# Patient Record
Sex: MALE | Race: WHITE | NOT HISPANIC OR LATINO | Employment: FULL TIME | URBAN - METROPOLITAN AREA
[De-identification: names, ages, dates, MRNs, and addresses within clinical notes are randomized per-mention and may not be internally consistent; named-entity substitution may affect disease eponyms.]

---

## 2024-03-18 ASSESSMENT — PROMIS GLOBAL HEALTH SCALE
EMOTIONAL_PROBLEMS: NEVER
RATE_QUALITY_OF_LIFE: VERY GOOD
RATE_PHYSICAL_HEALTH: GOOD
RATE_AVERAGE_PAIN: 1
CARRYOUT_SOCIAL_ACTIVITIES: VERY GOOD
RATE_MENTAL_HEALTH: VERY GOOD
RATE_GENERAL_HEALTH: GOOD
CARRYOUT_PHYSICAL_ACTIVITIES: COMPLETELY
RATE_SOCIAL_SATISFACTION: VERY GOOD

## 2024-03-19 ENCOUNTER — OFFICE VISIT (OUTPATIENT)
Dept: PRIMARY CARE | Facility: CLINIC | Age: 44
End: 2024-03-19
Payer: COMMERCIAL

## 2024-03-19 VITALS
BODY MASS INDEX: 33.03 KG/M2 | SYSTOLIC BLOOD PRESSURE: 152 MMHG | HEIGHT: 69 IN | WEIGHT: 223 LBS | DIASTOLIC BLOOD PRESSURE: 86 MMHG

## 2024-03-19 DIAGNOSIS — E11.9 DIABETES MELLITUS TYPE 2 IN NONOBESE (MULTI): ICD-10-CM

## 2024-03-19 DIAGNOSIS — E78.2 MIXED HYPERLIPIDEMIA: ICD-10-CM

## 2024-03-19 DIAGNOSIS — Z00.00 HEALTHCARE MAINTENANCE: ICD-10-CM

## 2024-03-19 PROBLEM — R06.83 SNORING: Status: ACTIVE | Noted: 2024-03-19

## 2024-03-19 PROBLEM — A60.00 GENITAL HERPES: Status: ACTIVE | Noted: 2024-03-19

## 2024-03-19 PROBLEM — R21 RASH: Status: ACTIVE | Noted: 2024-03-19

## 2024-03-19 PROBLEM — K21.9 ESOPHAGEAL REFLUX: Status: ACTIVE | Noted: 2024-03-19

## 2024-03-19 PROBLEM — G47.33 OSA ON CPAP: Status: ACTIVE | Noted: 2024-03-19

## 2024-03-19 PROBLEM — S69.90XA FINGER INJURY: Status: ACTIVE | Noted: 2024-03-19

## 2024-03-19 PROBLEM — E78.5 HYPERLIPIDEMIA: Status: ACTIVE | Noted: 2024-03-19

## 2024-03-19 PROBLEM — E55.9 VITAMIN D DEFICIENCY: Status: ACTIVE | Noted: 2024-03-19

## 2024-03-19 PROBLEM — R11.2 NAUSEA & VOMITING: Status: ACTIVE | Noted: 2024-03-19

## 2024-03-19 PROBLEM — J30.1 ALLERGIC RHINITIS DUE TO POLLEN: Status: ACTIVE | Noted: 2024-03-19

## 2024-03-19 PROBLEM — R74.8 ELEVATED LIVER ENZYMES: Status: ACTIVE | Noted: 2024-03-19

## 2024-03-19 PROBLEM — K20.0 EOSINOPHILIC ESOPHAGITIS: Status: ACTIVE | Noted: 2024-03-19

## 2024-03-19 PROBLEM — H10.45 CHRONIC ALLERGIC CONJUNCTIVITIS: Status: ACTIVE | Noted: 2024-03-19

## 2024-03-19 PROBLEM — J01.90 ACUTE SINUSITIS: Status: ACTIVE | Noted: 2024-03-19

## 2024-03-19 PROBLEM — S62.637A CLOSED DISPLACED FRACTURE OF DISTAL PHALANX OF LEFT LITTLE FINGER: Status: ACTIVE | Noted: 2024-03-19

## 2024-03-19 PROBLEM — R13.10 DYSPHAGIA: Status: ACTIVE | Noted: 2024-03-19

## 2024-03-19 PROBLEM — E87.1 HYPONATREMIA: Status: ACTIVE | Noted: 2024-03-19

## 2024-03-19 PROBLEM — R73.01 IMPAIRED FASTING GLUCOSE: Status: ACTIVE | Noted: 2024-03-19

## 2024-03-19 PROBLEM — K21.00 REFLUX ESOPHAGITIS: Status: ACTIVE | Noted: 2024-03-19

## 2024-03-19 PROCEDURE — 3077F SYST BP >= 140 MM HG: CPT | Performed by: INTERNAL MEDICINE

## 2024-03-19 PROCEDURE — 99396 PREV VISIT EST AGE 40-64: CPT | Performed by: INTERNAL MEDICINE

## 2024-03-19 PROCEDURE — 1036F TOBACCO NON-USER: CPT | Performed by: INTERNAL MEDICINE

## 2024-03-19 PROCEDURE — 3079F DIAST BP 80-89 MM HG: CPT | Performed by: INTERNAL MEDICINE

## 2024-03-19 RX ORDER — METFORMIN HYDROCHLORIDE 1000 MG/1
1 TABLET ORAL 2 TIMES DAILY
COMMUNITY
Start: 2021-02-22 | End: 2024-04-02 | Stop reason: SDUPTHER

## 2024-03-19 RX ORDER — GLIMEPIRIDE 2 MG/1
1 TABLET ORAL 2 TIMES DAILY
COMMUNITY
Start: 2021-03-18 | End: 2024-04-02 | Stop reason: DRUGHIGH

## 2024-03-19 NOTE — PROGRESS NOTES
Subjective   Patient ID: Fabiano Guo is a 44 y.o. male who presents for cpe.    HPI   Patient is here for physical.  Patient has not been seen here for few years  He is taking his metformin and glimepiride inconsistently and stretching it out.  He has not done blood work for many years  He does not check his sugars  He has a 7-year-old autistic child and 18-year-old child with mental issues which kept him busy and could not take care of his own health    Past recap    Patient is here with complaints of having infection in his tooth  He was seen in the urgent care on March  Treated with Augmentin but its not helping and the swelling on the right side of the face is getting worse patient is in a lot of pain. He has not seen his dentist for past few years  He lost weight and quit taking his cholesterol and diabetes meds    Patient is here for follow-up  Patient wants to consider inspire instead of using CPAP for obstructive sleep apnea as it is not working out for him  Is getting sensation on the right elbow intermittently that he gets numbness and tingling radiating to the arm and the wrist  Did blood work for diabetes hypertension high cholesterol  Needs medication refill     He had endoscopy done which showed eosinophilic esophagitis, reflux esophagitis  He started on budesonide inhalation suspension slurry  He is still complaining of having lot of loose stools sulfur burps  He has cut down on soda trying to eat better feels food does not get digested  Follow-up on blood work    Routine physical because concerned about diabetes  He is complaining of having symptoms of dysphagia off and on for past 10 years  He had seen Dr. Willams in the past I reviewed the biopsy it shows patient has eosinophilic esophagitis  Follow-up on high cholesterol  Strong family history of diabetes     Past medical history: Esophageal esophagitis  Social history: Non-smoker social drinker no drugs  Family history: Father high cholesterol  "hypertension coronary artery disease stroke  Has strong family history of diabetes in the grandparents     Review of Systems    Objective   /86   Ht 1.753 m (5' 9\")   Wt 101 kg (223 lb)   BMI 32.93 kg/m²     Physical Exam  Vitals reviewed.   Constitutional:       Appearance: Normal appearance.   HENT:      Head: Normocephalic and atraumatic.      Right Ear: Tympanic membrane, ear canal and external ear normal.      Left Ear: Tympanic membrane, ear canal and external ear normal.      Nose: Nose normal.      Mouth/Throat:      Pharynx: Oropharynx is clear.   Eyes:      Extraocular Movements: Extraocular movements intact.      Conjunctiva/sclera: Conjunctivae normal.      Pupils: Pupils are equal, round, and reactive to light.   Cardiovascular:      Rate and Rhythm: Normal rate and regular rhythm.      Pulses: Normal pulses.      Heart sounds: Normal heart sounds.   Pulmonary:      Effort: Pulmonary effort is normal.      Breath sounds: Normal breath sounds.   Abdominal:      General: Abdomen is flat. Bowel sounds are normal.      Palpations: Abdomen is soft.   Musculoskeletal:      Cervical back: Normal range of motion and neck supple.   Skin:     General: Skin is warm and dry.   Neurological:      General: No focal deficit present.      Mental Status: He is alert and oriented to person, place, and time.   Psychiatric:         Mood and Affect: Mood normal.       Assessment/Plan   Problem List Items Addressed This Visit             ICD-10-CM       Cardiac and Vasculature    Hyperlipidemia E78.5    Relevant Orders    CBC    Comprehensive Metabolic Panel    Lipid Panel    Thyroid Stimulating Hormone    Hemoglobin A1C       Endocrine/Metabolic    New onset type 2 diabetes mellitus (CMS/HCC) E11.9    Relevant Orders    CBC    Comprehensive Metabolic Panel    Lipid Panel    Thyroid Stimulating Hormone    Hemoglobin A1C     Other Visit Diagnoses         Codes    Healthcare maintenance     Z00.00    Relevant Orders "    CT cardiac scoring wo IV contrast        2/22  Blood work results reviewed  A1c done in office pretty high 10.9  Discussed diabetes complications of untreated blood sugars  Glucometer arranged  Test strips ordered  Start Metformin  Blood work results showed high cholesterol  Start atorvastatin  Follow-up blood work in 3 months     8/16   Blood work results reviewed  A1c has come down to 5.8  Triglycerides extremely high  Discussed complications of elevated triglycerides including heart attack and stroke and pancreatitis  Patient wants to try with diet and exercise does not want to take TriCor at this time  Continue Lipitor  He quit taking Metformin because of the side effects he heard from his friends  Refer patient to sleep lab for evaluation for inspire  Again discussed complications of uncontrolled cholesterol  Discussed diet and exercise  Follow-up with 3 months     11/14/22  Ideally patient should be seen with dentist  Patient says he has no way to correct seen by the dentist today  Oral antibiotics are not working  Will admit patient to the hospital for IV antibiotic and CAT scan  Most likely he has sepsis that is why not responding to the oral antibiotics  Also concerned that diabetes is probably playing a role  Will admit patient to the hospital directly once bed available     3/19/2024  Physical done  Patient is clinically stable but explained that diabetes is very serious condition and has long-term complications  Blood work ordered 1 month supply of glimepiride and metformin given  Follow-up after the blood work  CT cardiac scoring for cardiac risk assessment  Prescribe Dexcom

## 2024-03-21 ENCOUNTER — LAB (OUTPATIENT)
Dept: LAB | Facility: LAB | Age: 44
End: 2024-03-21
Payer: COMMERCIAL

## 2024-03-21 DIAGNOSIS — E78.2 MIXED HYPERLIPIDEMIA: ICD-10-CM

## 2024-03-21 DIAGNOSIS — E11.9 DIABETES MELLITUS TYPE 2 IN NONOBESE (MULTI): ICD-10-CM

## 2024-03-21 LAB
ALBUMIN SERPL BCP-MCNC: 4.7 G/DL (ref 3.4–5)
ALP SERPL-CCNC: 84 U/L (ref 33–120)
ALT SERPL W P-5'-P-CCNC: 23 U/L (ref 10–52)
ANION GAP SERPL CALC-SCNC: 11 MMOL/L (ref 10–20)
AST SERPL W P-5'-P-CCNC: 17 U/L (ref 9–39)
BILIRUB SERPL-MCNC: 0.9 MG/DL (ref 0–1.2)
BUN SERPL-MCNC: 12 MG/DL (ref 6–23)
CALCIUM SERPL-MCNC: 9.7 MG/DL (ref 8.6–10.6)
CHLORIDE SERPL-SCNC: 103 MMOL/L (ref 98–107)
CHOLEST SERPL-MCNC: 273 MG/DL (ref 0–199)
CHOLESTEROL/HDL RATIO: 5.9
CO2 SERPL-SCNC: 28 MMOL/L (ref 21–32)
CREAT SERPL-MCNC: 0.95 MG/DL (ref 0.5–1.3)
EGFRCR SERPLBLD CKD-EPI 2021: >90 ML/MIN/1.73M*2
ERYTHROCYTE [DISTWIDTH] IN BLOOD BY AUTOMATED COUNT: 12.3 % (ref 11.5–14.5)
EST. AVERAGE GLUCOSE BLD GHB EST-MCNC: 217 MG/DL
GLUCOSE SERPL-MCNC: 157 MG/DL (ref 74–99)
HBA1C MFR BLD: 9.2 %
HCT VFR BLD AUTO: 47.5 % (ref 41–52)
HDLC SERPL-MCNC: 46.6 MG/DL
HGB BLD-MCNC: 16.5 G/DL (ref 13.5–17.5)
LDLC SERPL CALC-MCNC: 190 MG/DL
MCH RBC QN AUTO: 29.6 PG (ref 26–34)
MCHC RBC AUTO-ENTMCNC: 34.7 G/DL (ref 32–36)
MCV RBC AUTO: 85 FL (ref 80–100)
NON HDL CHOLESTEROL: 226 MG/DL (ref 0–149)
NRBC BLD-RTO: 0 /100 WBCS (ref 0–0)
PLATELET # BLD AUTO: 294 X10*3/UL (ref 150–450)
POTASSIUM SERPL-SCNC: 4.7 MMOL/L (ref 3.5–5.3)
PROT SERPL-MCNC: 7.7 G/DL (ref 6.4–8.2)
RBC # BLD AUTO: 5.57 X10*6/UL (ref 4.5–5.9)
SODIUM SERPL-SCNC: 137 MMOL/L (ref 136–145)
TRIGL SERPL-MCNC: 184 MG/DL (ref 0–149)
TSH SERPL-ACNC: 1.1 MIU/L (ref 0.44–3.98)
VLDL: 37 MG/DL (ref 0–40)
WBC # BLD AUTO: 7.3 X10*3/UL (ref 4.4–11.3)

## 2024-03-21 PROCEDURE — 83036 HEMOGLOBIN GLYCOSYLATED A1C: CPT

## 2024-03-21 PROCEDURE — 84443 ASSAY THYROID STIM HORMONE: CPT

## 2024-03-21 PROCEDURE — 80053 COMPREHEN METABOLIC PANEL: CPT

## 2024-03-21 PROCEDURE — 36415 COLL VENOUS BLD VENIPUNCTURE: CPT

## 2024-03-21 PROCEDURE — 85027 COMPLETE CBC AUTOMATED: CPT

## 2024-03-21 PROCEDURE — 80061 LIPID PANEL: CPT

## 2024-03-28 ASSESSMENT — ENCOUNTER SYMPTOMS
HEADACHES: 0
SHORTNESS OF BREATH: 0
PALPITATIONS: 0
NECK PAIN: 0
HYPERTENSION: 1
SWEATS: 0
BLURRED VISION: 0
PND: 0
ORTHOPNEA: 0

## 2024-04-02 ENCOUNTER — OFFICE VISIT (OUTPATIENT)
Dept: PRIMARY CARE | Facility: CLINIC | Age: 44
End: 2024-04-02
Payer: COMMERCIAL

## 2024-04-02 VITALS — WEIGHT: 223 LBS | BODY MASS INDEX: 33.03 KG/M2 | HEIGHT: 69 IN

## 2024-04-02 DIAGNOSIS — E11.65 UNCONTROLLED TYPE 2 DIABETES MELLITUS WITH HYPERGLYCEMIA (MULTI): ICD-10-CM

## 2024-04-02 DIAGNOSIS — I10 PRIMARY HYPERTENSION: ICD-10-CM

## 2024-04-02 DIAGNOSIS — K21.9 GASTROESOPHAGEAL REFLUX DISEASE WITHOUT ESOPHAGITIS: ICD-10-CM

## 2024-04-02 DIAGNOSIS — E11.9 NEW ONSET TYPE 2 DIABETES MELLITUS (MULTI): ICD-10-CM

## 2024-04-02 DIAGNOSIS — E78.2 MIXED HYPERLIPIDEMIA: ICD-10-CM

## 2024-04-02 PROCEDURE — 99214 OFFICE O/P EST MOD 30 MIN: CPT | Performed by: INTERNAL MEDICINE

## 2024-04-02 PROCEDURE — 4010F ACE/ARB THERAPY RXD/TAKEN: CPT | Performed by: INTERNAL MEDICINE

## 2024-04-02 PROCEDURE — 3046F HEMOGLOBIN A1C LEVEL >9.0%: CPT | Performed by: INTERNAL MEDICINE

## 2024-04-02 PROCEDURE — 3050F LDL-C >= 130 MG/DL: CPT | Performed by: INTERNAL MEDICINE

## 2024-04-02 RX ORDER — METFORMIN HYDROCHLORIDE 1000 MG/1
1000 TABLET ORAL 2 TIMES DAILY
Qty: 180 TABLET | Refills: 0 | Status: SHIPPED | OUTPATIENT
Start: 2024-04-02

## 2024-04-02 RX ORDER — BLOOD SUGAR DIAGNOSTIC
STRIP MISCELLANEOUS
Qty: 200 STRIP | Refills: 0 | Status: SHIPPED | OUTPATIENT
Start: 2024-04-02

## 2024-04-02 RX ORDER — ROSUVASTATIN CALCIUM 10 MG/1
10 TABLET, COATED ORAL DAILY
Qty: 90 TABLET | Refills: 0 | Status: SHIPPED | OUTPATIENT
Start: 2024-04-02 | End: 2025-05-07

## 2024-04-02 RX ORDER — GLIMEPIRIDE 2 MG/1
2 TABLET ORAL 2 TIMES DAILY
Qty: 180 TABLET | Refills: 0 | Status: CANCELLED | OUTPATIENT
Start: 2024-04-02

## 2024-04-02 RX ORDER — METFORMIN HYDROCHLORIDE 1000 MG/1
1000 TABLET ORAL 2 TIMES DAILY
Qty: 180 TABLET | Refills: 0 | Status: CANCELLED | OUTPATIENT
Start: 2024-04-02

## 2024-04-02 RX ORDER — GLIMEPIRIDE 4 MG/1
4 TABLET ORAL 2 TIMES DAILY
Qty: 180 TABLET | Refills: 0 | Status: SHIPPED | OUTPATIENT
Start: 2024-04-02 | End: 2025-05-07

## 2024-04-02 RX ORDER — LOSARTAN POTASSIUM 50 MG/1
50 TABLET ORAL DAILY
Qty: 90 TABLET | Refills: 0 | Status: SHIPPED | OUTPATIENT
Start: 2024-04-02 | End: 2025-04-02

## 2024-04-02 ASSESSMENT — ENCOUNTER SYMPTOMS
HEADACHES: 0
NECK PAIN: 0
SHORTNESS OF BREATH: 0
HYPERTENSION: 1
PND: 0
ORTHOPNEA: 0
BLURRED VISION: 0
SWEATS: 0
PALPITATIONS: 0

## 2024-04-02 NOTE — PROGRESS NOTES
Subjective   Patient ID: Fabiano Guo is a 44 y.o. male who presents for Follow-up and Med Refill.    Hypertension  This is a recurrent problem. The current episode started more than 1 year ago. The problem has been gradually improving since onset. The problem is resistant. Pertinent negatives include no anxiety, blurred vision, chest pain, headaches, malaise/fatigue, neck pain, orthopnea, palpitations, peripheral edema, PND, shortness of breath or sweats. Agents associated with hypertension include decongestants. Risk factors for coronary artery disease include diabetes mellitus, dyslipidemia, family history and obesity. Compliance problems include diet and exercise.       Patient is here for follow-up on his blood work    Patient is here for physical.  Patient has not been seen here for few years  He is taking his metformin and glimepiride inconsistently and stretching it out.  He has not done blood work for many years  He does not check his sugars  He has a 7-year-old autistic child and 18-year-old child with mental issues which kept him busy and could not take care of his own health    Past recap    Patient is here with complaints of having infection in his tooth  He was seen in the urgent care on March  Treated with Augmentin but its not helping and the swelling on the right side of the face is getting worse patient is in a lot of pain. He has not seen his dentist for past few years  He lost weight and quit taking his cholesterol and diabetes meds    Patient is here for follow-up  Patient wants to consider inspire instead of using CPAP for obstructive sleep apnea as it is not working out for him  Is getting sensation on the right elbow intermittently that he gets numbness and tingling radiating to the arm and the wrist  Did blood work for diabetes hypertension high cholesterol  Needs medication refill     He had endoscopy done which showed eosinophilic esophagitis, reflux esophagitis  He started on budesonide  "inhalation suspension slurry  He is still complaining of having lot of loose stools sulfur burps  He has cut down on soda trying to eat better feels food does not get digested  Follow-up on blood work    Routine physical because concerned about diabetes  He is complaining of having symptoms of dysphagia off and on for past 10 years  He had seen Dr. Willams in the past I reviewed the biopsy it shows patient has eosinophilic esophagitis  Follow-up on high cholesterol  Strong family history of diabetes     Past medical history: Esophageal esophagitis  Social history: Non-smoker social drinker no drugs  Family history: Father high cholesterol hypertension coronary artery disease stroke,dm  Has strong family history of diabetes in the grandparents     Review of Systems   Constitutional:  Negative for malaise/fatigue.   Eyes:  Negative for blurred vision.   Respiratory:  Negative for shortness of breath.    Cardiovascular:  Negative for chest pain, palpitations, orthopnea and PND.   Musculoskeletal:  Negative for neck pain.   Neurological:  Negative for headaches.       Objective   Ht 1.753 m (5' 9\")   Wt 101 kg (223 lb)   BMI 32.93 kg/m²     Physical Exam  Vitals reviewed.   Constitutional:       Appearance: Normal appearance.   HENT:      Head: Normocephalic and atraumatic.      Right Ear: Tympanic membrane, ear canal and external ear normal.      Left Ear: Tympanic membrane, ear canal and external ear normal.      Nose: Nose normal.      Mouth/Throat:      Pharynx: Oropharynx is clear.   Eyes:      Extraocular Movements: Extraocular movements intact.      Conjunctiva/sclera: Conjunctivae normal.      Pupils: Pupils are equal, round, and reactive to light.   Cardiovascular:      Rate and Rhythm: Normal rate and regular rhythm.      Pulses: Normal pulses.      Heart sounds: Normal heart sounds.   Pulmonary:      Effort: Pulmonary effort is normal.      Breath sounds: Normal breath sounds.   Abdominal:      General: " Abdomen is flat. Bowel sounds are normal.      Palpations: Abdomen is soft.   Musculoskeletal:      Cervical back: Normal range of motion and neck supple.   Skin:     General: Skin is warm and dry.   Neurological:      General: No focal deficit present.      Mental Status: He is alert and oriented to person, place, and time.   Psychiatric:         Mood and Affect: Mood normal.         Assessment/Plan   Problem List Items Addressed This Visit             ICD-10-CM       Endocrine/Metabolic    New onset type 2 diabetes mellitus (CMS/HCC) E11.9   2/22  Blood work results reviewed  A1c done in office pretty high 10.9  Discussed diabetes complications of untreated blood sugars  Glucometer arranged  Test strips ordered  Start Metformin  Blood work results showed high cholesterol  Start atorvastatin  Follow-up blood work in 3 months     8/16   Blood work results reviewed  A1c has come down to 5.8  Triglycerides extremely high  Discussed complications of elevated triglycerides including heart attack and stroke and pancreatitis  Patient wants to try with diet and exercise does not want to take TriCor at this time  Continue Lipitor  He quit taking Metformin because of the side effects he heard from his friends  Refer patient to sleep lab for evaluation for inspire  Again discussed complications of uncontrolled cholesterol  Discussed diet and exercise  Follow-up with 3 months     11/14/22  Ideally patient should be seen with dentist  Patient says he has no way to correct seen by the dentist today  Oral antibiotics are not working  Will admit patient to the hospital for IV antibiotic and CAT scan  Most likely he has sepsis that is why not responding to the oral antibiotics  Also concerned that diabetes is probably playing a role  Will admit patient to the hospital directly once bed available     3/19/2024  Physical done  Patient is clinically stable but explained that diabetes is very serious condition and has long-term  complications  Blood work ordered 1 month supply of glimepiride and metformin given  Follow-up after the blood work  CT cardiac scoring for cardiac risk assessment  Prescribe Dexcom    4/2/2024  Blood work reviewed  A1c 9.2 very out-of-control  Start Amaryl 4 mg twice a day and  Metformin thousand twice a day  Januvia 100 mg once a day  Cholesterol is very high  Start Crestor 10 mg daily  Blood pressure is  Start losartan  Follow-up in 3 months  Again discussed diet exercise losing weight and compliance with medications

## 2024-04-08 RX ORDER — OMEPRAZOLE 40 MG/1
40 CAPSULE, DELAYED RELEASE ORAL
Qty: 180 CAPSULE | Refills: 0 | Status: SHIPPED | OUTPATIENT
Start: 2024-04-08 | End: 2024-10-05

## 2024-04-10 DIAGNOSIS — E66.9 OBESITY, UNSPECIFIED CLASSIFICATION, UNSPECIFIED OBESITY TYPE, UNSPECIFIED WHETHER SERIOUS COMORBIDITY PRESENT: ICD-10-CM

## 2025-09-02 ENCOUNTER — APPOINTMENT (OUTPATIENT)
Dept: PRIMARY CARE | Facility: CLINIC | Age: 45
End: 2025-09-02
Payer: COMMERCIAL

## 2025-09-22 ENCOUNTER — APPOINTMENT (OUTPATIENT)
Dept: PRIMARY CARE | Facility: CLINIC | Age: 45
End: 2025-09-22
Payer: COMMERCIAL